# Patient Record
Sex: FEMALE | Race: WHITE
[De-identification: names, ages, dates, MRNs, and addresses within clinical notes are randomized per-mention and may not be internally consistent; named-entity substitution may affect disease eponyms.]

---

## 2018-02-14 ENCOUNTER — HOSPITAL ENCOUNTER (OUTPATIENT)
Dept: HOSPITAL 93 - AMB-ENDOS | Age: 78
Discharge: HOME | End: 2018-02-14
Attending: COLON & RECTAL SURGERY
Payer: COMMERCIAL

## 2018-02-14 DIAGNOSIS — K57.30: Primary | ICD-10-CM

## 2018-02-14 DIAGNOSIS — K64.8: ICD-10-CM

## 2019-11-06 ENCOUNTER — HOSPITAL ENCOUNTER (EMERGENCY)
Dept: HOSPITAL 93 - ER | Age: 79
Discharge: HOME | End: 2019-11-06
Payer: COMMERCIAL

## 2019-11-06 VITALS — HEIGHT: 60 IN | WEIGHT: 150 LBS | BODY MASS INDEX: 29.45 KG/M2

## 2019-11-06 DIAGNOSIS — E86.0: ICD-10-CM

## 2019-11-06 DIAGNOSIS — Z90.710: ICD-10-CM

## 2019-11-06 DIAGNOSIS — I88.8: ICD-10-CM

## 2019-11-06 DIAGNOSIS — D64.89: ICD-10-CM

## 2019-11-06 DIAGNOSIS — Z90.49: ICD-10-CM

## 2019-11-06 DIAGNOSIS — R10.12: ICD-10-CM

## 2019-11-06 DIAGNOSIS — A08.8: Primary | ICD-10-CM

## 2020-02-18 ENCOUNTER — HOSPITAL ENCOUNTER (EMERGENCY)
Dept: HOSPITAL 93 - ER | Age: 80
Discharge: HOME | End: 2020-02-18
Payer: COMMERCIAL

## 2020-02-18 VITALS — HEIGHT: 61 IN | BODY MASS INDEX: 29.45 KG/M2 | WEIGHT: 156 LBS

## 2020-02-18 DIAGNOSIS — K52.89: Primary | ICD-10-CM

## 2020-03-12 ENCOUNTER — HOSPITAL ENCOUNTER (OUTPATIENT)
Dept: HOSPITAL 93 - EKG | Age: 80
Discharge: HOME | End: 2020-03-12
Attending: COLON & RECTAL SURGERY
Payer: COMMERCIAL

## 2020-03-12 DIAGNOSIS — R15.9: Primary | ICD-10-CM

## 2020-03-12 DIAGNOSIS — Z01.810: ICD-10-CM

## 2021-08-11 ENCOUNTER — HOSPITAL ENCOUNTER (OUTPATIENT)
Dept: HOSPITAL 93 - AMB-ENDOS | Age: 81
Discharge: HOME | End: 2021-08-11
Attending: COLON & RECTAL SURGERY
Payer: COMMERCIAL

## 2021-08-11 DIAGNOSIS — Z12.11: ICD-10-CM

## 2021-08-11 DIAGNOSIS — K62.89: Primary | ICD-10-CM

## 2021-08-11 DIAGNOSIS — Z20.822: ICD-10-CM

## 2021-08-11 DIAGNOSIS — K64.8: ICD-10-CM

## 2022-01-24 ENCOUNTER — HOSPITAL ENCOUNTER (INPATIENT)
Dept: HOSPITAL 93 - ER | Age: 82
LOS: 16 days | Discharge: HOME | DRG: 812 | End: 2022-02-09
Attending: INTERNAL MEDICINE | Admitting: INTERNAL MEDICINE
Payer: COMMERCIAL

## 2022-01-24 VITALS — WEIGHT: 11.02 LBS | HEIGHT: 61 IN | BODY MASS INDEX: 2.08 KG/M2

## 2022-01-24 DIAGNOSIS — Z79.4: ICD-10-CM

## 2022-01-24 DIAGNOSIS — K57.30: ICD-10-CM

## 2022-01-24 DIAGNOSIS — Z99.89: ICD-10-CM

## 2022-01-24 DIAGNOSIS — G47.33: ICD-10-CM

## 2022-01-24 DIAGNOSIS — K63.89: ICD-10-CM

## 2022-01-24 DIAGNOSIS — E78.00: ICD-10-CM

## 2022-01-24 DIAGNOSIS — I35.0: ICD-10-CM

## 2022-01-24 DIAGNOSIS — R19.5: ICD-10-CM

## 2022-01-24 DIAGNOSIS — Z90.49: ICD-10-CM

## 2022-01-24 DIAGNOSIS — I11.9: ICD-10-CM

## 2022-01-24 DIAGNOSIS — D50.0: Primary | ICD-10-CM

## 2022-01-24 DIAGNOSIS — E11.9: ICD-10-CM

## 2022-01-24 DIAGNOSIS — K52.89: ICD-10-CM

## 2022-01-24 DIAGNOSIS — C49.A3: ICD-10-CM

## 2022-01-24 DIAGNOSIS — D63.8: ICD-10-CM

## 2022-01-24 DIAGNOSIS — Z86.010: ICD-10-CM

## 2022-01-24 NOTE — NUR
SE REALIZAN MUESTRAS DE CULTIVOS DE [place]ARD[place] POR ORDEN MEDICA, SE ENTREGA EMBACE
DE ORINA PARA CULTIVO.

## 2022-01-24 NOTE — NUR
SE REALZIA LLAMADA A BANCO DE [place] Y SE LOGRA CONTACTO CON [facility]. BAUSO PARA
NOTIFICAR ORDEN DE PACIENTE SE 3 UNIDADES DE PRBC PARA TRANFUNDIR, SE NOTIFICA
QUE SE TOMARON TUBOS PILOTOS Y SERAN ENVIADOS. PACIENTE AL MOMENTO SE MANTIENE
EN OBSERVACION POR ORDEN MEDICA.

## 2022-01-24 NOTE — NUR
SE RECIBE PTE ALERTA Y ORIENTADA X3 QUIEN REFIERE VENIR REFERIDA DEL DR MANJU HOOD POR ANEMIA DE 8.4. SE MONITOREAN S/V Y SE UBICA.

## 2022-01-24 NOTE — NUR
MRS. GARSIAA  PTE SOOBRE [place] MEDICO ESTA REFIERE ENTENDER. SE JOSE M MUESTRAS D
LABORATORIO UTILIZANDO MEDIDAS ASEPTICAS. SE COLOCA H/L EL CUAL; SE ENCUENTRA
PATENTE.

## 2022-01-25 PROCEDURE — CD171ZZ PLANAR NUCLEAR MEDICINE IMAGING OF GASTROINTESTINAL TRACT USING TECHNETIUM 99M (TC-99M): ICD-10-PCS | Performed by: INTERNAL MEDICINE

## 2022-01-25 PROCEDURE — 30233N1 TRANSFUSION OF NONAUTOLOGOUS RED BLOOD CELLS INTO PERIPHERAL VEIN, PERCUTANEOUS APPROACH: ICD-10-PCS | Performed by: GENERAL PRACTICE

## 2022-01-28 PROCEDURE — 0DJD8ZZ INSPECTION OF LOWER INTESTINAL TRACT, VIA NATURAL OR ARTIFICIAL OPENING ENDOSCOPIC: ICD-10-PCS | Performed by: COLON & RECTAL SURGERY

## 2022-01-29 PROCEDURE — BW2510Z COMPUTERIZED TOMOGRAPHY (CT SCAN) OF CHEST, ABDOMEN AND PELVIS USING LOW OSMOLAR CONTRAST, UNENHANCED AND ENHANCED: ICD-10-PCS | Performed by: INTERNAL MEDICINE

## 2022-01-30 PROCEDURE — 02HV33Z INSERTION OF INFUSION DEVICE INTO SUPERIOR VENA CAVA, PERCUTANEOUS APPROACH: ICD-10-PCS | Performed by: INTERNAL MEDICINE

## 2022-01-30 PROCEDURE — B24BYZZ ULTRASONOGRAPHY OF HEART WITH AORTA USING OTHER CONTRAST: ICD-10-PCS | Performed by: INTERNAL MEDICINE

## 2022-02-04 PROCEDURE — 0JB83ZX EXCISION OF ABDOMEN SUBCUTANEOUS TISSUE AND FASCIA, PERCUTANEOUS APPROACH, DIAGNOSTIC: ICD-10-PCS | Performed by: RADIOLOGY
